# Patient Record
Sex: MALE | Race: WHITE
[De-identification: names, ages, dates, MRNs, and addresses within clinical notes are randomized per-mention and may not be internally consistent; named-entity substitution may affect disease eponyms.]

---

## 2018-10-19 ENCOUNTER — HOSPITAL ENCOUNTER (OUTPATIENT)
Dept: HOSPITAL 74 - FASU | Age: 28
Discharge: HOME | End: 2018-10-19
Attending: ORTHOPAEDIC SURGERY
Payer: COMMERCIAL

## 2018-10-19 VITALS — SYSTOLIC BLOOD PRESSURE: 130 MMHG | HEART RATE: 84 BPM | DIASTOLIC BLOOD PRESSURE: 68 MMHG

## 2018-10-19 VITALS — TEMPERATURE: 98.1 F

## 2018-10-19 VITALS — BODY MASS INDEX: 25.8 KG/M2

## 2018-10-19 DIAGNOSIS — S83.512A: Primary | ICD-10-CM

## 2018-10-19 DIAGNOSIS — S83.242A: ICD-10-CM
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
## 2018-10-19 PROCEDURE — 0MRP47Z REPLACEMENT OF LEFT KNEE BURSA AND LIGAMENT WITH AUTOLOGOUS TISSUE SUBSTITUTE, PERCUTANEOUS ENDOSCOPIC APPROACH: ICD-10-PCS | Performed by: ORTHOPAEDIC SURGERY

## 2018-10-19 PROCEDURE — 0SBD4ZZ EXCISION OF LEFT KNEE JOINT, PERCUTANEOUS ENDOSCOPIC APPROACH: ICD-10-PCS | Performed by: ORTHOPAEDIC SURGERY

## 2018-10-19 NOTE — SURG
Surgery First Assist Note


First Assist: Talia Hernandez PA-C


Date of Service: 10/19/18


Diagnosis: 





Left ACL rupture and Medial meniscus tear


Procedure: 





Left knee Arthroscopy, ACL reconstruction with BTB Autograft and medial 

meniscus repair


I was present for the entirety of the operative procedure. For further detail, 

please refer to operative report.








Visit type





- Case Type


Case Type: Scheduled





- Emergency


Emergency Visit: No





- New patient


This patient is new to me today: Yes


Date on this admission: 10/19/18

## 2018-10-19 NOTE — OP
Operative Note





- Note:


Operative Date: 10/19/18


Pre-Operative Diagnosis: Left knee ACl Rupture. Medial Meniscus tear


Operation: Left knee Arthroscopy, ACL reconstruction with BTB Autograft and 

medial meniscus repair


Post-Operative Diagnosis: Same as Pre-op


Surgeon: Kalpesh Ribera


Assistant: Talia Hernandez


Anesthesiologist/CRNA: Erasto Cardenas


Anesthesia: Local (block with sedation)


Estimated Blood Loss (mls): 30


Fluid Volume Replaced (mls): 900


Operative Report Dictated: Yes

## 2018-10-24 NOTE — PATH
Surgical Pathology Report



Patient Name:  DAISY GILLIAM

Accession #:  [id number]

Med. Rec. #:  [id number]                                                        

   /Age/Gender:  1990 (Age: 27) / M

Account:  [id number]                                                          

             Location: [facility] AMBUL[facility] 

Taken:  10/19/2018

Received:  10/19/2018

Reported:  10/24/2018

Physicians:  Kalpesh Ribera M.D.

  



Specimen(s) Received

 SHAVINGS LEFT KNEE 





Clinical History

Left ACL tear







Final Diagnosis

KNEE, LEFT, ARTHROSCOPIC SHAVINGS:

FIBROSYNOVIAL TISSUE AND BONE.





***Electronically Signed***

Talia Harp M.D.





Gross Description

Received in formalin, labeled "shavings left knee," is a 2.8 x 2.4 x 0.3 cm.

aggregate of tan-yellow soft tissue fragments. A representative portion is

submitted in one cassette.

/10/22/2018



saudi/10/22/2018